# Patient Record
Sex: FEMALE | Race: WHITE | NOT HISPANIC OR LATINO | URBAN - NONMETROPOLITAN AREA
[De-identification: names, ages, dates, MRNs, and addresses within clinical notes are randomized per-mention and may not be internally consistent; named-entity substitution may affect disease eponyms.]

---

## 2016-10-24 PROBLEM — H25.13: Noted: 2018-01-23

## 2019-02-11 ENCOUNTER — IMPORTED ENCOUNTER (OUTPATIENT)
Dept: URBAN - NONMETROPOLITAN AREA CLINIC 1 | Facility: CLINIC | Age: 64
End: 2019-02-11

## 2019-02-11 PROCEDURE — 92014 COMPRE OPH EXAM EST PT 1/>: CPT

## 2019-02-11 PROCEDURE — 92015 DETERMINE REFRACTIVE STATE: CPT

## 2019-02-11 NOTE — PATIENT DISCUSSION
Myopia-Discussed diagnosis with patient. -Explained that people who are myopic are at a higher risk for developing RD/RT and reviewed associated S&S.-Pt to contact our office if symptoms develop. Presbyopia-Discussed diagnosis with patient. Updated spec Rx given. DM s DR-Stressed the importance of keeping blood sugars under control and regular visits with PCP. -Explained the possible effects of poorly controlled diabetes and the damage that diabetes can cause to ocular health. -Pt instructed to contact our office with any vision changes. Cataract OU-Not yet surgical. -Reviewed symptoms of advancing cataract growth such as glare and halos and decreased vision.-Continue to monitor for now. Pt will notify us if any new symptoms develop.

## 2020-02-17 ENCOUNTER — IMPORTED ENCOUNTER (OUTPATIENT)
Dept: URBAN - NONMETROPOLITAN AREA CLINIC 1 | Facility: CLINIC | Age: 65
End: 2020-02-17

## 2020-02-17 PROBLEM — H52.03: Noted: 2020-02-17

## 2020-02-17 PROBLEM — H25.13: Noted: 2018-01-23

## 2020-02-17 PROCEDURE — 92014 COMPRE OPH EXAM EST PT 1/>: CPT

## 2020-02-17 PROCEDURE — 92015 DETERMINE REFRACTIVE STATE: CPT

## 2021-04-16 ENCOUNTER — IMPORTED ENCOUNTER (OUTPATIENT)
Dept: URBAN - NONMETROPOLITAN AREA CLINIC 1 | Facility: CLINIC | Age: 66
End: 2021-04-16

## 2021-04-16 PROCEDURE — 92014 COMPRE OPH EXAM EST PT 1/>: CPT

## 2021-04-16 PROCEDURE — 92015 DETERMINE REFRACTIVE STATE: CPT

## 2021-04-16 NOTE — PATIENT DISCUSSION
hyperopia-Discussed diagnosis with patient. -Explained that people who are myopic are at a higher risk for developing RD/RT and reviewed associated S&S.-Pt to contact our office if symptoms develop. Presbyopia-Discussed diagnosis with patient. Updated spec Rx given. DM s DR-Stressed the importance of keeping blood sugars under control and regular visits with PCP. -Explained the possible effects of poorly controlled diabetes and the damage that diabetes can cause to ocular health. -Pt instructed to contact our office with any vision changes. Cataract OU-Not yet surgical. -Reviewed symptoms of advancing cataract growth such as glare and halos and decreased vision.-Continue to monitor for now. Pt will notify us if any new symptoms develop.

## 2022-04-10 ASSESSMENT — VISUAL ACUITY
OD_SC: 20/20
OS_SC: 20/25
OS_CC: J1
OD_CC: J1
OD_SC: 20/20
OU_CC: J1+
OD_SC: 20/20
OS_SC: 20/20
OS_SC: 20/20
OU_CC: J1+

## 2022-04-10 ASSESSMENT — TONOMETRY
OD_IOP_MMHG: 16
OS_IOP_MMHG: 17
OD_IOP_MMHG: 18
OS_IOP_MMHG: 16
OD_IOP_MMHG: 17
OS_IOP_MMHG: 18

## 2022-04-27 ENCOUNTER — ESTABLISHED PATIENT (OUTPATIENT)
Dept: RURAL CLINIC 1 | Facility: CLINIC | Age: 67
End: 2022-04-27

## 2022-04-27 DIAGNOSIS — E11.9: ICD-10-CM

## 2022-04-27 DIAGNOSIS — H25.13: ICD-10-CM

## 2022-04-27 DIAGNOSIS — H52.03: ICD-10-CM

## 2022-04-27 PROCEDURE — 92014 COMPRE OPH EXAM EST PT 1/>: CPT

## 2022-04-27 PROCEDURE — 92015 DETERMINE REFRACTIVE STATE: CPT

## 2022-04-27 ASSESSMENT — VISUAL ACUITY
OS_PH: 20/25-2
OS_CC: 20/30
OD_CC: 20/20-2
OU_CC: 20/25-1

## 2022-04-27 ASSESSMENT — TONOMETRY
OS_IOP_MMHG: 15
OD_IOP_MMHG: 15

## 2022-04-27 NOTE — PATIENT DISCUSSION
Patient educated on today's findings. No signs of diabetic retinopathy. Discussed importance of blood sugar control. RTC 1 year for CEE.

## 2022-06-22 ENCOUNTER — CONSULTATION/EVALUATION (OUTPATIENT)
Dept: RURAL CLINIC 1 | Facility: CLINIC | Age: 67
End: 2022-06-22

## 2022-06-22 DIAGNOSIS — H02.413: ICD-10-CM

## 2022-06-22 PROCEDURE — 95060 OPH MUCOUS MEMBRANE TESTS: CPT

## 2022-06-22 PROCEDURE — 99214 OFFICE O/P EST MOD 30 MIN: CPT

## 2022-06-22 ASSESSMENT — VISUAL ACUITY
OS_CC: 20/25
OD_CC: 20/25-2

## 2022-06-22 ASSESSMENT — TONOMETRY
OS_IOP_MMHG: 13
OD_IOP_MMHG: 13

## 2022-06-22 NOTE — PATIENT DISCUSSION
The risks, benefits, and alternatives to surgery were discussed. The patient elects to proceed with surgery. MRD1 0 OU BLF 14 OU TBUT 12 secs OU (-) LAG OU Valium RX to be sent in to pharmacy Discussed post op expectations with patient Advised to d/c any blood thinners or vitamins x 3 weeks prior. Order VF 24-2 and photos (taped and untaped) prior. Discussed bruising and what she will look like s/p surgery.

## 2022-10-26 ENCOUNTER — CLINIC PROCEDURE ONLY (OUTPATIENT)
Dept: RURAL CLINIC 1 | Facility: CLINIC | Age: 67
End: 2022-10-26

## 2022-10-26 DIAGNOSIS — H02.413: ICD-10-CM

## 2022-10-26 PROCEDURE — 99212 OFFICE O/P EST SF 10 MIN: CPT

## 2022-10-26 NOTE — PATIENT DISCUSSION
patient was brought into operating room. Upon examination irritation, redness, scaling on the skin noticed on upper and lower lids of the right eye.

## 2022-10-26 NOTE — PATIENT DISCUSSION
discussed above with patient and patients daughter. patient voiced she recently had micro needling done on her face. and that it caused redness.

## 2022-10-26 NOTE — PATIENT DISCUSSION
will have patient use Maxitrol for about three weeks and massage the upper and lower eyelids at least three times a day. [id number] [facility] to see me for follow up and will see if we should proceed with ptosis surgery. Recommended to patient that if she has any facials retinol creams or micro needling again to avoid the eyes. To only go to brow line and cheekbone due to the risk of it tightening the skin and pulling down the lower lid cause ectropion. Patient and daughter voiced understanding.

## 2022-12-07 ENCOUNTER — FOLLOW UP (OUTPATIENT)
Dept: URBAN - NONMETROPOLITAN AREA CLINIC 4 | Facility: CLINIC | Age: 67
End: 2022-12-07

## 2022-12-07 PROCEDURE — 99213 OFFICE O/P EST LOW 20 MIN: CPT

## 2022-12-07 ASSESSMENT — VISUAL ACUITY
OS_SC: 20/50-2
OS_PH: 20/25
OD_SC: 20/40
OD_PH: 20/25

## 2022-12-07 NOTE — PATIENT DISCUSSION
Risks, benefits, and alternatives of ptosis repair discussed with the patient. Treatment options include observation or surgical correction.

## 2022-12-07 NOTE — PATIENT DISCUSSION
Discussed no blood thinners, baby aspirin, fish oil, or MTV for 3 weeks prior to the procedure. Patient is ok to now proceed with ptosis surgery. Will have surgery dept call.

## 2023-01-21 ENCOUNTER — CLINIC PROCEDURE ONLY (OUTPATIENT)
Dept: RURAL CLINIC 1 | Facility: CLINIC | Age: 68
End: 2023-01-21

## 2023-01-21 DIAGNOSIS — H02.413: ICD-10-CM

## 2023-01-21 PROCEDURE — 6790450 RPR BLPOS LEVATOR RESCJ/ADVMNT XTRNL, BILATERAL

## 2023-04-24 ENCOUNTER — ESTABLISHED PATIENT (OUTPATIENT)
Dept: RURAL CLINIC 1 | Facility: CLINIC | Age: 68
End: 2023-04-24

## 2023-04-24 DIAGNOSIS — H25.13: ICD-10-CM

## 2023-04-24 DIAGNOSIS — E11.9: ICD-10-CM

## 2023-04-24 DIAGNOSIS — H52.03: ICD-10-CM

## 2023-04-24 DIAGNOSIS — Z98.890: ICD-10-CM

## 2023-04-24 PROCEDURE — 92015 DETERMINE REFRACTIVE STATE: CPT

## 2023-04-24 PROCEDURE — 92014 COMPRE OPH EXAM EST PT 1/>: CPT

## 2023-04-24 ASSESSMENT — VISUAL ACUITY
OU_CC: 20/25-1
OD_CC: 20/25-1
OD_CC: 20/25
OS_CC: 20/30
OU_CC: 20/25
OS_CC: 20/25

## 2023-04-24 ASSESSMENT — TONOMETRY
OS_IOP_MMHG: 14
OD_IOP_MMHG: 14